# Patient Record
Sex: MALE | Race: WHITE | NOT HISPANIC OR LATINO | Employment: FULL TIME | URBAN - METROPOLITAN AREA
[De-identification: names, ages, dates, MRNs, and addresses within clinical notes are randomized per-mention and may not be internally consistent; named-entity substitution may affect disease eponyms.]

---

## 2023-01-25 ENCOUNTER — LAB (OUTPATIENT)
Dept: LAB | Facility: HOSPITAL | Age: 23
End: 2023-01-25
Payer: COMMERCIAL

## 2023-01-25 ENCOUNTER — TRANSCRIBE ORDERS (OUTPATIENT)
Dept: ADMINISTRATIVE | Facility: HOSPITAL | Age: 23
End: 2023-01-25
Payer: COMMERCIAL

## 2023-01-25 DIAGNOSIS — Z01.818 PRE-OP TESTING: ICD-10-CM

## 2023-01-25 DIAGNOSIS — Z01.818 PRE-OP TESTING: Primary | ICD-10-CM

## 2023-01-25 LAB
ANION GAP SERPL CALCULATED.3IONS-SCNC: 9 MMOL/L (ref 5–15)
BASOPHILS # BLD AUTO: 0.05 10*3/MM3 (ref 0–0.2)
BASOPHILS NFR BLD AUTO: 0.8 % (ref 0–1.5)
BUN SERPL-MCNC: 11 MG/DL (ref 6–20)
BUN/CREAT SERPL: 13.1 (ref 7–25)
CALCIUM SPEC-SCNC: 9.6 MG/DL (ref 8.6–10.5)
CHLORIDE SERPL-SCNC: 104 MMOL/L (ref 98–107)
CO2 SERPL-SCNC: 28 MMOL/L (ref 22–29)
CREAT SERPL-MCNC: 0.84 MG/DL (ref 0.76–1.27)
DEPRECATED RDW RBC AUTO: 38.4 FL (ref 37–54)
EGFRCR SERPLBLD CKD-EPI 2021: 126.4 ML/MIN/1.73
EOSINOPHIL # BLD AUTO: 0.54 10*3/MM3 (ref 0–0.4)
EOSINOPHIL NFR BLD AUTO: 9.1 % (ref 0.3–6.2)
ERYTHROCYTE [DISTWIDTH] IN BLOOD BY AUTOMATED COUNT: 11.8 % (ref 12.3–15.4)
GLUCOSE SERPL-MCNC: 102 MG/DL (ref 65–99)
HCT VFR BLD AUTO: 44.7 % (ref 37.5–51)
HGB BLD-MCNC: 15 G/DL (ref 13–17.7)
IMM GRANULOCYTES # BLD AUTO: 0.01 10*3/MM3 (ref 0–0.05)
IMM GRANULOCYTES NFR BLD AUTO: 0.2 % (ref 0–0.5)
LYMPHOCYTES # BLD AUTO: 2.01 10*3/MM3 (ref 0.7–3.1)
LYMPHOCYTES NFR BLD AUTO: 34 % (ref 19.6–45.3)
MCH RBC QN AUTO: 30.7 PG (ref 26.6–33)
MCHC RBC AUTO-ENTMCNC: 33.6 G/DL (ref 31.5–35.7)
MCV RBC AUTO: 91.6 FL (ref 79–97)
MONOCYTES # BLD AUTO: 0.61 10*3/MM3 (ref 0.1–0.9)
MONOCYTES NFR BLD AUTO: 10.3 % (ref 5–12)
NEUTROPHILS NFR BLD AUTO: 2.69 10*3/MM3 (ref 1.7–7)
NEUTROPHILS NFR BLD AUTO: 45.6 % (ref 42.7–76)
NRBC BLD AUTO-RTO: 0 /100 WBC (ref 0–0.2)
PLATELET # BLD AUTO: 272 10*3/MM3 (ref 140–450)
PMV BLD AUTO: 8.8 FL (ref 6–12)
POTASSIUM SERPL-SCNC: 4 MMOL/L (ref 3.5–5.2)
RBC # BLD AUTO: 4.88 10*6/MM3 (ref 4.14–5.8)
SODIUM SERPL-SCNC: 141 MMOL/L (ref 136–145)
WBC NRBC COR # BLD: 5.91 10*3/MM3 (ref 3.4–10.8)

## 2023-01-25 PROCEDURE — 36415 COLL VENOUS BLD VENIPUNCTURE: CPT

## 2023-01-25 PROCEDURE — 85025 COMPLETE CBC W/AUTO DIFF WBC: CPT

## 2023-01-25 PROCEDURE — 80048 BASIC METABOLIC PNL TOTAL CA: CPT

## 2025-07-24 ENCOUNTER — OFFICE VISIT (OUTPATIENT)
Dept: SLEEP MEDICINE | Facility: CLINIC | Age: 25
End: 2025-07-24
Payer: COMMERCIAL

## 2025-07-24 VITALS
WEIGHT: 243 LBS | OXYGEN SATURATION: 98 % | HEART RATE: 88 BPM | DIASTOLIC BLOOD PRESSURE: 85 MMHG | BODY MASS INDEX: 31.18 KG/M2 | HEIGHT: 74 IN | SYSTOLIC BLOOD PRESSURE: 133 MMHG

## 2025-07-24 DIAGNOSIS — R29.818 SUSPECTED SLEEP APNEA: Primary | ICD-10-CM

## 2025-07-24 DIAGNOSIS — R06.83 LOUD SNORING: ICD-10-CM

## 2025-07-24 DIAGNOSIS — G47.8 NON-RESTORATIVE SLEEP: ICD-10-CM

## 2025-07-24 DIAGNOSIS — E66.811 CLASS 1 OBESITY WITH BODY MASS INDEX (BMI) OF 31.0 TO 31.9 IN ADULT, UNSPECIFIED OBESITY TYPE, UNSPECIFIED WHETHER SERIOUS COMORBIDITY PRESENT: ICD-10-CM

## 2025-07-24 PROCEDURE — G0463 HOSPITAL OUTPT CLINIC VISIT: HCPCS

## 2025-07-24 RX ORDER — DIPHENOXYLATE HYDROCHLORIDE AND ATROPINE SULFATE 2.5; .025 MG/1; MG/1
TABLET ORAL DAILY
COMMUNITY

## 2025-07-24 NOTE — PROGRESS NOTES
[facility]  [address] [place] [id number]  Phone   Fax       Flavio Miles  [phone]   2000  25 y.o.  male      PCP:Provider, No Known    Type of service: Initial New Patient Office Visit  Date of service: 7/24/2025          CHIEF COMPLAINT: Suspected sleep apnea      HISTORY OF PRESENT ILLNESS:  Flavio Miles 25 y.o.  presents to the [facility] today as a new patient.    Patient has a history of trouble concentrating.   Patient has no history of tonsillectomy, adenoidectomy, nasal surgery, UPPP.   Patient presents today with symptoms of snoring, non-restorative sleep, and suspected sleep apnea.  The symptoms are chronic in nature.  Wakes up most days feeling tired, not restored.  Reports history of trouble staying awake at times with driving due to fatigue.  Patient advised of risks of tired/drowsy driving and was advised against it.  Symptoms concerning for sleep apnea.  Does have family history of sleep apnea.        PATIENT HISTORY:  Sleep schedule:  Bedtime: 11 PM weeknights, 11 PM or later weekends  Wake time: 7 AM weekdays, 9 AM or later weekends  Time it takes to fall asleep: 45 to 60 minutes  Average hours of sleep: 6 hours weeknights, 9 hours weekend  Number of naps per day: 2 on the weekends    Symptoms:  In addition to the above, patient reports:   Have you ever awakened gasping for breath, coughing, choking: No   Change in weight:  Yes, gained 30 pounds  Morning headaches:  No   Awaken with a sore throat or dry mouth:  Yes   Leg jerking at night:  Yes   Creepy crawly feeling in legs/urge to move legs: No   Teeth grinding: No   Have you ever awakened at night with a sour taste or burning sensation in your chest:  No   Do you have muscle weakness with laughing or anger:  No   Have you ever felt paralyzed while going to sleep or waking up:  No   Sleepwalking: No   Nightmares: No   Nocturia (urination at night): 0 times per  "night  Memory Problem: No     Past medical history: (Relevant to sleep medicine)  Trouble concentrating      Social history:  Do you drive a commercial vehicle:  No   Shift work:  No   Tobacco use:  No  Alcohol use:  4 per week  Marijuana or other drug use: No  Occupation:       Family history (parents, siblings, children) (relevant to sleep medicine):  Hypertension  Asthma  Sleep apnea    Medications: reviewed     ALLERGIES: Patient has no known allergies.        REVIEW OF SYSTEMS:  Full review of systems available on the intake form which is scanned in the media tab.  The relevant positives are noted below:  Jacob Sleepiness Scale: Total score: 7   Fatigue  Snoring  Nasal congestion          PHYSICAL EXAM:  Vitals:    07/24/25 1300   BP: 133/85   BP Location: Left arm   Patient Position: Sitting   Pulse: 88   SpO2: 98%   Weight: 110 kg (243 lb)   Height: 188 cm (74\")    Body mass index is 31.2 kg/m². Neck Circumference: 16 inches  HEAD: atraumatic, normocephalic  THROAT: Mallampati class II-III  NECK: Neck Circumference: 16 inches, trachea is in the midline  RESPIRATORY SYSTEM: Respirations even, unlabored, normal rate  CARDIOVASULAR SYSTEM: Normal rate, no edema   EXTREMITES: No cyanosis or clubbing  NEUROLOGICAL SYSTEM: Alert and oriented x 3    Office notes from care team reviewed. Office note(s) reviewed: 5/2022 urgent care note reviewed      Labs/ Test Results Reviewed:      2014 CT chest reviewed  2023 BMP reviewed: CO2 level within normal limit          ASSESSMENT AND PLAN:   Suspected sleep apnea: patient's symptoms and physical examination are concerning for possible sleep apnea.   I discussed the signs, symptoms, and pathophysiology of sleep apnea with this patient.  I also discussed the possible complications of untreated sleep apnea including but not limited to potential risk of resistant hypertension, insulin resistance, pulmonary hypertension, atrial fibrillation or other " arrhythmias, heart attack, stroke, nonrestorative sleep with hypersomnia which can increase risk for motor vehicle accidents, etc.   Different testing methods including home-based and lab based sleep studies were discussed with this patient.   Based on patient history and physical examination, will proceed with home sleep study.  The order for the sleep study is placed in [facility].  The test will be scheduled after prior authorization has been obtained through patient's insurance.  Discussed overview of treatment options for sleep apnea in the office today including PAP therapy and oral mandibular advancement device, and treatment/ management will be discussed in more detail with this patient after the test is completed.  All questions were answered to patient's satisfaction.  Snoring: snoring is the sound created by turbulent airflow vibrating upper airway soft tissue.  I have also discussed factors affecting snoring including sleep deprivation, sleeping on the back and alcohol ingestion. To minimize snoring, patient is advised to have adequate sleep, sleep on their side, and avoid alcohol and sedative medications around bedtime.   Daytime hypersomnolence/excessive daytime sleepiness:  [place] Sleepiness Scale of Total score: 7.  There are many causes of daytime sleepiness and/or fatigue.  Evaluate for sleep apnea as a possible contributing factor, as above.  Do not drive, operate heavy machinery, or do activities that require high concentration if feeling tired/drowsy.  Obesity: Body mass index is 31.2 kg/m².. Patients who are overweight or obese are at increased risk of sleep apnea/ sleep disordered breathing. Weight reduction and healthy lifestyle are encouraged in overweight/ obese patients as part of a comprehensive approach to sleep apnea treatment.    Trouble falling asleep at night: We discussed healthy sleep habits in detail.  Discussed trying or maintain regular bedtime and wake times regardless of weekday or  weekend.  Handout provided on healthy sleep habits.  Also discussed [facility] nu[facility].  Evaluate for sleep apnea as a contributing factor.  Also discussed possible referral for CBT-I if symptoms worsen or fail to improve.  At risk for ineffective health maintenance: Patient does not currently have a primary care provider.  Declines referral for new PCP.  He has a PCP office he is planning to call to schedule an appointment.  Did advise proceeding with this.  Discussed importance of effective health maintenance.    I have also discussed the following:  Sleep hygiene: try to maintain a regular bed time and wake time, avoid watching TV/ using electronic devices in bed (including cell phones), limit caffeinated and alcoholic beverages before bed, try to maintain a cool and quiet sleep environment, avoid daytime naps  Adequate amount of sleep: most people need around 7 to 9 hours of sleep each night        Patient will follow-up after study, 31 to 90 days after PAP therapy initiated if applicable, or sooner for issues or concerns.  Patient's questions were answered.        Thank you for allowing me to participate in the care of this patient.    Gloria Powell DNP, APRN  [facility]

## 2025-08-13 ENCOUNTER — HOSPITAL ENCOUNTER (OUTPATIENT)
Dept: SLEEP MEDICINE | Facility: HOSPITAL | Age: 25
Discharge: HOME | End: 2025-08-13
Admitting: NURSE PRACTITIONER
Payer: COMMERCIAL

## 2025-08-13 DIAGNOSIS — E66.811 CLASS 1 OBESITY WITH BODY MASS INDEX (BMI) OF 31.0 TO 31.9 IN ADULT, UNSPECIFIED OBESITY TYPE, UNSPECIFIED WHETHER SERIOUS COMORBIDITY PRESENT: ICD-10-CM

## 2025-08-13 DIAGNOSIS — R29.818 SUSPECTED SLEEP APNEA: ICD-10-CM

## 2025-08-13 DIAGNOSIS — R06.83 LOUD SNORING: ICD-10-CM

## 2025-08-13 DIAGNOSIS — G47.8 NON-RESTORATIVE SLEEP: ICD-10-CM

## 2025-08-13 PROCEDURE — G0399 HOME SLEEP TEST/TYPE 3 PORTA: HCPCS

## 2025-08-19 DIAGNOSIS — G47.33 OSA (OBSTRUCTIVE SLEEP APNEA): Primary | ICD-10-CM

## 2025-08-19 DIAGNOSIS — R06.83 SNORING: ICD-10-CM
